# Patient Record
Sex: FEMALE | Race: WHITE
[De-identification: names, ages, dates, MRNs, and addresses within clinical notes are randomized per-mention and may not be internally consistent; named-entity substitution may affect disease eponyms.]

---

## 2018-03-26 PROBLEM — Z00.00 ENCOUNTER FOR PREVENTIVE HEALTH EXAMINATION: Status: ACTIVE | Noted: 2018-03-26

## 2018-03-29 ENCOUNTER — APPOINTMENT (OUTPATIENT)
Dept: RHEUMATOLOGY | Facility: CLINIC | Age: 53
End: 2018-03-29
Payer: COMMERCIAL

## 2018-03-29 ENCOUNTER — LABORATORY RESULT (OUTPATIENT)
Age: 53
End: 2018-03-29

## 2018-03-29 VITALS
BODY MASS INDEX: 28 KG/M2 | HEART RATE: 74 BPM | SYSTOLIC BLOOD PRESSURE: 118 MMHG | DIASTOLIC BLOOD PRESSURE: 72 MMHG | HEIGHT: 63 IN | WEIGHT: 158 LBS | OXYGEN SATURATION: 98 %

## 2018-03-29 DIAGNOSIS — Z83.3 FAMILY HISTORY OF DIABETES MELLITUS: ICD-10-CM

## 2018-03-29 DIAGNOSIS — Z82.49 FAMILY HISTORY OF ISCHEMIC HEART DISEASE AND OTHER DISEASES OF THE CIRCULATORY SYSTEM: ICD-10-CM

## 2018-03-29 DIAGNOSIS — Z87.891 PERSONAL HISTORY OF NICOTINE DEPENDENCE: ICD-10-CM

## 2018-03-29 DIAGNOSIS — J45.909 UNSPECIFIED ASTHMA, UNCOMPLICATED: ICD-10-CM

## 2018-03-29 DIAGNOSIS — Z78.9 OTHER SPECIFIED HEALTH STATUS: ICD-10-CM

## 2018-03-29 DIAGNOSIS — M25.50 PAIN IN UNSPECIFIED JOINT: ICD-10-CM

## 2018-03-29 DIAGNOSIS — E05.90 THYROTOXICOSIS, UNSPECIFIED W/OUT THYROTOXIC CRISIS OR STORM: ICD-10-CM

## 2018-03-29 PROCEDURE — 36415 COLL VENOUS BLD VENIPUNCTURE: CPT

## 2018-03-29 PROCEDURE — 99205 OFFICE O/P NEW HI 60 MIN: CPT | Mod: 25

## 2018-03-29 RX ORDER — ALBUTEROL 90 MCG
AEROSOL (GRAM) INHALATION
Refills: 0 | Status: ACTIVE | COMMUNITY

## 2018-03-29 RX ORDER — LEVOTHYROXINE SODIUM 137 UG/1
TABLET ORAL
Refills: 0 | Status: ACTIVE | COMMUNITY

## 2018-04-03 LAB
ANA PAT FLD IF-IMP: ABNORMAL
ANA SER IF-ACNC: ABNORMAL
B19V IGG SER QL IA: 0.8 INDEX
B19V IGG+IGM SER-IMP: NEGATIVE
B19V IGG+IGM SER-IMP: NORMAL
B19V IGM FLD-ACNC: 0.1 INDEX
B19V IGM SER-ACNC: NEGATIVE
B2 GLYCOPROT1 IGA SERPL IA-ACNC: <5 SAU
B2 GLYCOPROT1 IGG SER-ACNC: <5 SGU
B2 GLYCOPROT1 IGM SER-ACNC: <5 SMU
C3 SERPL-MCNC: 100 MG/DL
C4 SERPL-MCNC: 26 MG/DL
CARDIOLIPIN IGM SER-MCNC: 6.6 MPL
CARDIOLIPIN IGM SER-MCNC: <5 GPL
CCP AB SER IA-ACNC: <8 UNITS
CRP SERPL-MCNC: 0.4 MG/DL
DEPRECATED CARDIOLIPIN IGA SER: <5 APL
DSDNA AB SER-ACNC: <12 IU/ML
ENA RNP AB SER IA-ACNC: 6.2 AL
ENA SM AB SER IA-ACNC: 0.5 AL
ERYTHROCYTE [SEDIMENTATION RATE] IN BLOOD BY WESTERGREN METHOD: 8 MM/HR
IGA SER QL IEP: 248 MG/DL
IGG SER QL IEP: 1120 MG/DL
IGM SER QL IEP: 84 MG/DL
RF+CCP IGG SER-IMP: NEGATIVE
RHEUMATOID FACT SER QL: <7 IU/ML
THYROPEROXIDASE AB SERPL IA-ACNC: 1115 IU/ML
TSH SERPL-ACNC: 0.13 UIU/ML

## 2018-04-30 ENCOUNTER — APPOINTMENT (OUTPATIENT)
Dept: RHEUMATOLOGY | Facility: CLINIC | Age: 53
End: 2018-04-30

## 2023-10-20 ENCOUNTER — OFFICE VISIT (OUTPATIENT)
Age: 58
End: 2023-10-20
Payer: COMMERCIAL

## 2023-10-20 VITALS
HEART RATE: 107 BPM | WEIGHT: 152 LBS | TEMPERATURE: 99.5 F | RESPIRATION RATE: 18 BRPM | SYSTOLIC BLOOD PRESSURE: 96 MMHG | DIASTOLIC BLOOD PRESSURE: 66 MMHG | OXYGEN SATURATION: 97 %

## 2023-10-20 DIAGNOSIS — U07.1 COVID-19: Primary | ICD-10-CM

## 2023-10-20 PROBLEM — K21.9 GERD (GASTROESOPHAGEAL REFLUX DISEASE): Status: ACTIVE | Noted: 2023-10-20

## 2023-10-20 PROBLEM — R20.2 PARESTHESIA: Status: ACTIVE | Noted: 2017-03-16

## 2023-10-20 PROBLEM — H40.009 PREGLAUCOMA: Status: ACTIVE | Noted: 2023-10-20

## 2023-10-20 PROBLEM — M54.81 OCCIPITAL NEURALGIA OF LEFT SIDE: Status: ACTIVE | Noted: 2017-03-16

## 2023-10-20 PROBLEM — D47.2 MGUS (MONOCLONAL GAMMOPATHY OF UNKNOWN SIGNIFICANCE): Status: ACTIVE | Noted: 2018-03-22

## 2023-10-20 PROBLEM — R76.8 ANA POSITIVE: Status: ACTIVE | Noted: 2018-03-22

## 2023-10-20 PROBLEM — H92.02 OTALGIA OF LEFT EAR: Status: ACTIVE | Noted: 2022-06-22

## 2023-10-20 PROBLEM — E55.9 VITAMIN D DEFICIENCY: Status: ACTIVE | Noted: 2018-03-22

## 2023-10-20 PROBLEM — R79.89 LOW VITAMIN B12 LEVEL: Status: ACTIVE | Noted: 2018-03-22

## 2023-10-20 PROBLEM — R63.5 WEIGHT GAIN: Status: ACTIVE | Noted: 2020-04-22

## 2023-10-20 PROBLEM — M17.11 PRIMARY OSTEOARTHRITIS OF RIGHT KNEE: Status: ACTIVE | Noted: 2021-12-20

## 2023-10-20 PROBLEM — M54.50 LOWER BACK PAIN: Status: ACTIVE | Noted: 2023-10-20

## 2023-10-20 PROCEDURE — G0382 LEV 3 HOSP TYPE B ED VISIT: HCPCS

## 2023-10-20 RX ORDER — GABAPENTIN 300 MG/1
300 CAPSULE ORAL
COMMUNITY
Start: 2023-09-25

## 2023-10-20 RX ORDER — ONDANSETRON 8 MG/1
8 TABLET, ORALLY DISINTEGRATING ORAL EVERY 8 HOURS PRN
COMMUNITY
Start: 2023-09-27

## 2023-10-20 RX ORDER — OMEPRAZOLE 40 MG/1
40 CAPSULE, DELAYED RELEASE ORAL
COMMUNITY
Start: 2023-06-05

## 2023-10-20 RX ORDER — LEVOTHYROXINE SODIUM 137 UG/1
1 TABLET ORAL DAILY
COMMUNITY
Start: 2023-09-07

## 2023-10-20 RX ORDER — IBUPROFEN 800 MG/1
TABLET ORAL
COMMUNITY
Start: 2023-09-01

## 2023-10-20 RX ORDER — BUDESONIDE AND FORMOTEROL FUMARATE DIHYDRATE 80; 4.5 UG/1; UG/1
2 AEROSOL RESPIRATORY (INHALATION) 2 TIMES DAILY
COMMUNITY
Start: 2023-04-24

## 2023-10-20 RX ORDER — MIRABEGRON 50 MG/1
1 TABLET, FILM COATED, EXTENDED RELEASE ORAL DAILY
COMMUNITY
Start: 2023-10-19

## 2023-10-20 RX ORDER — MELOXICAM 15 MG/1
15 TABLET ORAL DAILY PRN
COMMUNITY
Start: 2023-09-25

## 2023-10-20 RX ORDER — SEMAGLUTIDE 1.7 MG/.75ML
1.7 INJECTION, SOLUTION SUBCUTANEOUS
COMMUNITY
Start: 2023-10-12

## 2023-10-20 RX ORDER — FESOTERODINE FUMARATE 4 MG/1
1 TABLET, EXTENDED RELEASE ORAL DAILY
COMMUNITY
Start: 2023-07-28

## 2023-10-20 NOTE — LETTER
[facility]  [address] [address]  [phone]  Dept: [phone]    October 20, 2023    Patient: Sumaya Guerrero  YOB: 1965    Sumaya Guerrero was seen and evaluated at our [facility]. Please note if Covid and Flu tests are negative, they may return to work when fever free for 24 hours without the use of a fever reducing agent. If Covid or Flu test is positive, they may return to work on 10/24/2023, as this is 5 days from the onset of symptoms. Upon return, they must then adhere to strict masking for an additional 5 days.     Sincerely,    ANSELMO Dial

## 2023-10-20 NOTE — PATIENT INSTRUCTIONS
May continue inhalers/nebulizers as previously prescirbed and over-the-counter products for symptoms: tylenol for fevers, ibuprofen for body aches, flonase (fluticasone) with nasal saline and sudafed for nasal congestion, mucinex for cough, and airborne/emergen-c for vitamin supplementation. May return to work if [address] for 24 hours without the use of medications and 5 days after symptom onset but recommend strict masking for 5 additional days once return to work. Follow-up with PCP in 3-5 days if no improvement of symptoms. Report to ER if symptoms worsen or develop difficulty breathing.

## 2023-10-20 NOTE — PROGRESS NOTES
[facility]        NAME: Nathaly Calvo is a 60 y.o. female  : 1965    MRN: [id number]  DATE: 2023  TIME: 1:51 PM    Assessment and Plan   COVID-19 [U07.1]  1. COVID-19          COVID positive at home, no need to repeat test in clinic. VSS in clinic, appears in no acute distress. Advised patient I am unable to offer Paxlovid at this time as she does not have recent labs with a GFR since . Advised her to follow-up with her PCP within 5 days of symptom onset regarding Paxlovid and lab work. Educated on use of OTC products for symptoms. Advised close follow-up with PCP or to report to the ER if symptoms worsen. Work note provided to return to work on 10/24/2023, 5 days from symptom onset. Patient verbalizes understanding and agreeable to plan. Patient Instructions     May continue inhalers/nebulizers as previously prescirbed and over-the-counter products for symptoms: tylenol for fevers, ibuprofen for body aches, flonase (fluticasone) with nasal saline and sudafed for nasal congestion, mucinex for cough, and airborne/emergen-c for vitamin supplementation. May return to work if [address] for 24 hours without the use of medications and 5 days after symptom onset but recommend strict masking for 5 additional days once return to work. Follow-up with PCP in 3-5 days if no improvement of symptoms. Report to ER if symptoms worsen or develop difficulty breathing. Chief Complaint     Chief Complaint   Patient presents with    Fever     Fever, body aches, cough X 1 day. Did at-home COVID test that was +         History of Present Illness       60year old female presents for evaluation of fatigue, fever (tmax 103F yesterday), body aches, cough, and congestion that started yesterday. She did a home COVID test that was positive yesterday and she also has a history of asthma. She reports some shortness of breath with exertion yesterday that resolved with inhaler use.  She denies associated productive sputum, nausea, vomiting, or diarrhea. She has been taking tylenol for fevers with some improvement. Her last dose was last night. Fever  This is a new problem. The current episode started yesterday. The problem occurs constantly. The problem has been unchanged. Associated symptoms include chills, congestion, coughing, fatigue, a fever, headaches, myalgias and weakness. Pertinent negatives include no abdominal pain, anorexia, arthralgias, change in bowel habit, chest pain, diaphoresis, joint swelling, nausea, neck pain, numbness, rash, sore throat, swollen glands, urinary symptoms, vertigo, visual change or vomiting. Nothing aggravates the symptoms. She has tried acetaminophen for the symptoms. The treatment provided mild relief. Review of Systems   Review of Systems   Constitutional:  Positive for activity change, chills, fatigue and fever. Negative for appetite change and diaphoresis. HENT:  Positive for congestion, postnasal drip, rhinorrhea and sinus pressure. Negative for sinus pain, sneezing, sore throat and trouble swallowing. Eyes:  Negative for visual disturbance. Respiratory:  Positive for cough. Negative for chest tightness and shortness of breath. Cardiovascular:  Negative for chest pain and palpitations. Gastrointestinal:  Negative for abdominal pain, anorexia, change in bowel habit, constipation, diarrhea, nausea and vomiting. Musculoskeletal:  Positive for myalgias. Negative for arthralgias, back pain, joint swelling and neck pain. Skin:  Negative for color change, pallor and rash. Allergic/Immunologic: Negative for environmental allergies and food allergies. Neurological:  Positive for weakness and headaches. Negative for dizziness, vertigo, light-headedness and numbness.          Current Medications       Current Outpatient Medications:     budesonide-formoterol (SYMBICORT) 80-4.5 MCG/ACT inhaler, Inhale 2 puffs 2 (two) times a day, Disp: , Rfl:     diclofenac sodium (VOLTAREN) 50 mg EC tablet, , Disp: , Rfl:     Fesoterodine Fumarate ER 4 MG TB24, Take 1 tablet by mouth daily, Disp: , Rfl:     gabapentin (NEURONTIN) 300 mg capsule, Take 300 mg by mouth daily at bedtime, Disp: , Rfl:     ibuprofen (MOTRIN) 800 mg tablet, TAKE 1 TABLET BY MOUTH EVERY 8 HOURS WITH FOOD FOR 14 DAYS AS NEEDED, Disp: , Rfl:     levothyroxine 137 mcg tablet, Take 1 tablet by mouth daily, Disp: , Rfl:     meloxicam (MOBIC) 15 mg tablet, Take 15 mg by mouth daily as needed, Disp: , Rfl:     Myrbetriq 50 MG TB24, Take 1 tablet by mouth daily, Disp: , Rfl:     omeprazole (PriLOSEC) 40 MG capsule, Take 40 mg by mouth, Disp: , Rfl:     ondansetron (ZOFRAN-ODT) 8 mg disintegrating tablet, Take 8 mg by mouth every 8 (eight) hours as needed, Disp: , Rfl:     Wegovy 1.7 MG/0.75ML, Inject 1.7 mg under the skin, Disp: , Rfl:     Cholecalciferol 50 MCG (2000 UT) TABS, Take 2,000 Units by mouth, Disp: , Rfl:     Current Allergies     Allergies as of 10/20/2023 - Reviewed 10/20/2023   Allergen Reaction Noted    Gadobutrol Hives 11/23/2021    Iopromide Hives 03/23/2017            The following portions of the patient's history were reviewed and updated as appropriate: allergies, current medications, past family history, past medical history, past social history, past surgical history and problem list.     History reviewed. No pertinent past medical history. History reviewed. No pertinent surgical history. History reviewed. No pertinent family history. Medications have been verified. Objective   BP 96/66 (BP Location: Left arm, Patient Position: Sitting, Cuff Size: Adult)   Pulse (!) 107   Temp 99.5 °F (37.5 °C) (Tympanic)   Resp 18   Wt 68.9 kg (152 lb)   SpO2 97%        Physical Exam     Physical Exam  Vitals and nursing note reviewed. Constitutional:       General: She is awake. Appearance: Normal appearance. She is well-developed and overweight.    HENT:      Head: Normocephalic and atraumatic. Right Ear: Hearing, ear canal and external ear normal. A middle ear effusion is present. Left Ear: Hearing, ear canal and external ear normal. A middle ear effusion is present. Nose: Congestion and rhinorrhea present. Rhinorrhea is clear. Right Turbinates: Enlarged. Not swollen or pale. Left Turbinates: Enlarged. Not swollen or pale. Right Sinus: Maxillary sinus tenderness present. No frontal sinus tenderness. Left Sinus: Maxillary sinus tenderness present. No frontal sinus tenderness. Mouth/Throat:      Lips: Pink. Mouth: Mucous membranes are moist.      Pharynx: Oropharynx is clear. Uvula midline. No oropharyngeal exudate or posterior oropharyngeal erythema. Tonsils: No tonsillar exudate or tonsillar abscesses. Eyes:      Conjunctiva/sclera: Conjunctivae normal.   Cardiovascular:      Rate and Rhythm: Normal rate and regular rhythm. Pulses: Normal pulses. Heart sounds: Normal heart sounds. Pulmonary:      Effort: Pulmonary effort is normal.      Breath sounds: Normal breath sounds. Musculoskeletal:      Cervical back: Full passive range of motion without pain, normal range of motion and neck supple. Lymphadenopathy:      Cervical: No cervical adenopathy. Skin:     General: Skin is warm and dry. Neurological:      General: No focal deficit present. Mental Status: She is alert and oriented to person, place, and time. Psychiatric:         Mood and Affect: Mood normal.         Behavior: Behavior normal. Behavior is cooperative. Thought Content:  Thought content normal.         Judgment: Judgment normal.

## 2024-01-18 ENCOUNTER — APPOINTMENT (OUTPATIENT)
Age: 59
End: 2024-01-18
Payer: COMMERCIAL

## 2024-01-18 ENCOUNTER — OFFICE VISIT (OUTPATIENT)
Age: 59
End: 2024-01-18
Payer: COMMERCIAL

## 2024-01-18 VITALS
RESPIRATION RATE: 18 BRPM | WEIGHT: 147 LBS | TEMPERATURE: 97.3 F | SYSTOLIC BLOOD PRESSURE: 124 MMHG | OXYGEN SATURATION: 98 % | HEART RATE: 74 BPM | DIASTOLIC BLOOD PRESSURE: 78 MMHG

## 2024-01-18 DIAGNOSIS — M25.572 ACUTE LEFT ANKLE PAIN: ICD-10-CM
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
## 2024-01-18 DIAGNOSIS — M25.562 ACUTE PAIN OF LEFT KNEE: ICD-10-CM

## 2024-01-18 DIAGNOSIS — M25.552 LEFT HIP PAIN: ICD-10-CM

## 2024-01-18 PROCEDURE — G0382 LEV 3 HOSP TYPE B ED VISIT: HCPCS | Performed by: PHYSICIAN ASSISTANT

## 2024-01-18 PROCEDURE — 73564 X-RAY EXAM KNEE 4 OR MORE: CPT

## 2024-01-18 PROCEDURE — 73610 X-RAY EXAM OF ANKLE: CPT

## 2024-01-18 PROCEDURE — 73501 X-RAY EXAM HIP UNI 1 VIEW: CPT

## 2024-01-18 RX ORDER — SENNOSIDES 8.6 MG
650 CAPSULE ORAL EVERY 8 HOURS PRN
Qty: 30 TABLET | Refills: 0 | Status: SHIPPED | OUTPATIENT
Start: 2024-01-18

## 2024-01-18 RX ORDER — IBUPROFEN 200 MG
400 TABLET ORAL EVERY 6 HOURS PRN
Start: 2024-01-18

## 2024-01-18 RX ORDER — METHOCARBAMOL 500 MG/1
500 TABLET, FILM COATED ORAL 2 TIMES DAILY PRN
Qty: 8 TABLET | Refills: 0 | Status: SHIPPED | OUTPATIENT
Start: 2024-01-18 | End: 2024-01-22
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
## 2024-01-19 NOTE — PATIENT INSTRUCTIONS
Contusion in Adults   WHAT YOU NEED TO KNOW:   A contusion is a bruise that appears on your skin after an injury. A bruise happens when small blood vessels tear but skin does not. Blood leaks into nearby tissue, such as soft tissue or muscle.  DISCHARGE INSTRUCTIONS:   Return to the emergency department if:   You have new trouble moving the injured area.    You have tingling or numbness in or near the injured area.    Your hand or foot below the bruise gets cold or turns pale.    Call your doctor if:   You find a new lump in the injured area.    Your symptoms do not improve with treatment after 4 to 5 days.    You have questions or concerns about your condition or care.    Medicines:  You may need any of the following:  NSAIDs  help decrease swelling and pain or fever. This medicine is available with or without a doctor's order. NSAIDs can cause stomach bleeding or kidney problems in certain people. If you take blood thinner medicine, always ask your healthcare provider if NSAIDs are safe for you. Always read the medicine label and follow directions.    Prescription pain medicine  may be given. Ask your healthcare provider how to take this medicine safely. Some prescription pain medicines contain acetaminophen. Do not take other medicines that contain acetaminophen without talking to your healthcare provider. Too much acetaminophen may cause liver damage. Prescription pain medicine may cause constipation. Ask your healthcare provider how to prevent or treat constipation.     Take your medicine as directed.  Contact your healthcare provider if you think your medicine is not helping or if you have side effects. Tell your provider if you are allergic to any medicine. Keep a list of the medicines, vitamins, and herbs you take. Include the amounts, and when and why you take them. Bring the list or the pill bottles to follow-up visits. Carry your medicine list with you in case of an emergency.    Help a contusion heal:    Rest the injured area  or use it less than usual. If you bruised your leg or foot, you may need crutches or a cane to help you walk. This will help you keep weight off your injured body part.     Apply ice  to decrease swelling and pain. Ice may also help prevent tissue damage. Use an ice pack, or put crushed ice in a plastic bag. Cover it with a towel and place it on your bruise for 15 to 20 minutes every hour or as directed.    Use compression  to support the area and decrease swelling. Wrap an elastic bandage around the area over the bruised muscle. Make sure the bandage is not too tight. You should be able to fit 1 finger between the bandage and your skin.    Elevate (raise) your injured body part  above the level of your heart to help decrease pain and swelling. Use pillows, blankets, or rolled towels to elevate the area as often as you can.    Do not drink alcohol  as directed. Alcohol may slow healing.    Do not stretch injured muscles  right after your injury. Ask your healthcare provider when and how you may safely stretch after your injury. Gentle stretches can help increase your flexibility.    Do not massage the area or put heating pads  on the bruise right after your injury. Heat and massage may slow healing. Your healthcare provider may tell you to apply heat after several days. At that time, heat will start to help the injury heal.    Prevent another contusion:   Stretch and warm up before you play sports or exercise.    Wear protective gear when you play sports. Examples are shin guards and padding.     If you begin a new physical activity, start slowly to give your body a chance to adjust.    Follow up with your doctor as directed:  Write down your questions so you remember to ask them during your visits.  © Copyright [employer] 2023 Information is for End User's use only and may not be sold, redistributed or otherwise used for commercial purposes.  The above information is an  only.  It is not intended as medical advice for individual conditions or treatments. Talk to your doctor, nurse or pharmacist before following any medical regimen to see if it is safe and effective for you.

## 2024-09-19 ENCOUNTER — EVALUATION (OUTPATIENT)
Dept: PHYSICAL THERAPY | Facility: CLINIC | Age: 59
End: 2024-09-19
Payer: COMMERCIAL

## 2024-09-19 DIAGNOSIS — G89.29 CHRONIC PAIN OF LEFT ANKLE: ICD-10-CM

## 2024-09-19 DIAGNOSIS — M25.572 CHRONIC PAIN OF LEFT ANKLE: ICD-10-CM

## 2024-09-19 DIAGNOSIS — M25.372 LEFT ANKLE INSTABILITY: Primary | ICD-10-CM

## 2024-09-19 PROCEDURE — 97161 PT EVAL LOW COMPLEX 20 MIN: CPT

## 2024-09-19 PROCEDURE — 97110 THERAPEUTIC EXERCISES: CPT
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
## 2024-09-26 ENCOUNTER — OFFICE VISIT (OUTPATIENT)
Dept: PHYSICAL THERAPY | Facility: CLINIC | Age: 59
End: 2024-09-26
Payer: COMMERCIAL

## 2024-09-26 DIAGNOSIS — M25.372 LEFT ANKLE INSTABILITY: Primary | ICD-10-CM

## 2024-09-26 DIAGNOSIS — M25.572 CHRONIC PAIN OF LEFT ANKLE: ICD-10-CM

## 2024-09-26 DIAGNOSIS — G89.29 CHRONIC PAIN OF LEFT ANKLE: ICD-10-CM

## 2024-09-26 PROCEDURE — 97112 NEUROMUSCULAR REEDUCATION: CPT

## 2024-09-26 PROCEDURE — 97110 THERAPEUTIC EXERCISES: CPT

## 2024-10-03 ENCOUNTER — OFFICE VISIT (OUTPATIENT)
Dept: PHYSICAL THERAPY | Facility: CLINIC | Age: 59
End: 2024-10-03
Payer: COMMERCIAL

## 2024-10-03 DIAGNOSIS — G89.29 CHRONIC PAIN OF LEFT ANKLE: ICD-10-CM

## 2024-10-03 DIAGNOSIS — M25.572 CHRONIC PAIN OF LEFT ANKLE: ICD-10-CM

## 2024-10-03 DIAGNOSIS — M25.372 LEFT ANKLE INSTABILITY: Primary | ICD-10-CM

## 2024-10-03 PROCEDURE — 97110 THERAPEUTIC EXERCISES: CPT

## 2024-10-03 PROCEDURE — 97112 NEUROMUSCULAR REEDUCATION: CPT

## 2024-10-03 NOTE — PROGRESS NOTES
"Daily Note     Today's date: 10/3/2024  Patient name: Patrica Montelongo  : 1965  MRN: [id number]  Referring provider: Piyush Crawford MD  Dx:   Encounter Diagnosis     ICD-10-CM    1. Left ankle instability  M25.372       2. Chronic pain of left ankle  M25.572     G89.29         Start Time: 1413  Stop Time: 1455  Total time in clinic (min): 42 minutes    Subjective: Pt reports no major changes since last visit. Pt reports that she is consistent w/ her HEP and that       Objective: See treatment diary below      Assessment: Tolerated treatment well. Updated HEP NV. Add functional exercise such as squats and lunges to further challenge Pt. Pt still has minimal symptom irritability w/ ankle circles w/ clock wise being the most irritable. Patient demonstrated fatigue post treatment, exhibited good technique with therapeutic exercises, and would benefit from continued PT for increased ROM, increased strength, increased stability, and decreased symptom irritability.       Plan: Continue per plan of care.      Precautions: Low back pain and Cervical pain     POC expires Unit limit Auth Expiration date PT/OT/ST + Visit Limit?   24 BOMN N/A BOMN                           Visit/Unit Tracking  AUTH Status:  Date  10/3            N/A Used 1 2 3             Remaining                  HEP: [id number]   Manuals  10/3          L ankle PROM  JMK JMK          Mid foot G3  JMK JMK          G3 Dorsiflexion  JMK JMK                       Neuro Re-Ed             SLR  2x10 3x10           Side lying hip abduction  2x10 3x10 3\"          Bridge  2x10 3\" 3x10 3\"          SL stance  Foam 3x30\" Foam 4x30\"                                                 Ther Ex             Hep education 8'            Bike   5' 5'          BAPS board  CW, CCW, DF/PF, I/E x30 ea CW, CCW, DF/PF, I/E x30 ea          BOSU x4  X30 ea X30 ea          TB ankle x4  GTB 3x10 ea                                                  Ther Activity   "                                     Gait Training                                       Modalities

## 2024-10-10 ENCOUNTER — OFFICE VISIT (OUTPATIENT)
Dept: PHYSICAL THERAPY | Facility: CLINIC | Age: 59
End: 2024-10-10
Payer: COMMERCIAL

## 2024-10-10 DIAGNOSIS — G89.29 CHRONIC PAIN OF LEFT ANKLE: ICD-10-CM

## 2024-10-10 DIAGNOSIS — M25.572 CHRONIC PAIN OF LEFT ANKLE: ICD-10-CM

## 2024-10-10 DIAGNOSIS — M25.372 LEFT ANKLE INSTABILITY: Primary | ICD-10-CM

## 2024-10-10 PROCEDURE — 97112 NEUROMUSCULAR REEDUCATION: CPT

## 2024-10-10 PROCEDURE — 97110 THERAPEUTIC EXERCISES: CPT

## 2024-10-10 NOTE — PROGRESS NOTES
Daily Note/ DC note     Today's date: 10/10/2024  Patient name: Patrica Montelongo  : 1965  MRN: [id number]  Referring provider: Piyush Crawford MD  Dx:   Encounter Diagnosis     ICD-10-CM    1. Left ankle instability  M25.372       2. Chronic pain of left ankle  M25.572     G89.29         Start Time: 1102  Stop Time: 1140  Total time in clinic (min): 38 minutes    Subjective: Pt reports that she is returning to work in [place] and would like to be DC from therapy w/ HEP.       Objective: See treatment diary below      Assessment: Tolerated treatment well. Provided Pt w/ updated HEP, educated Pt on new exercises. Pt did well w/ new balance activities today, occasionally utilized stepping strategy to regain balance. FOTO score improved to 59 surpassing MDC.  Pt DC from PT due to travel restriction, recommended she continue PT at different location if HEP is not successful. Patient demonstrated fatigue post treatment, exhibited good technique with therapeutic exercises, and would benefit from continued PT for increased ROM, increased strength, increased stability, and decreased symptom irritability.     Goals  Short term:  Pt will be independent w/ individualized HEP- MET  Pt will have a decrease in symptom irritability by 2 points- MET     Long term:  Pt will be able to perform stair negotiation w/o symptom irritability- Partially met   Pt will be able to walk one mile on uneven surfaces w/o symptom irritability- Partially MET  Pt will be able to ambulate w/o a brace for ADLs and walking over a mile- MET  Pt will improve FOTO by MDC- MET    Plan: DC w/ updated HEP     Precautions: Low back pain and Cervical pain     POC expires Unit limit Auth Expiration date PT/OT/ST + Visit Limit?   24 BOMN N/A BOMN                           Visit/Unit Tracking  AUTH Status:  Date 9/19 9/26 10/3 10/10           N/A Used 1 2 3 4            Remaining                  HEP: [id number]   Manuals 9/19 9/26 10/3 10/10        "  L ankle PROM  JMK ROSALINEK          Mid foot G3  JMK JMK          G3 Dorsiflexion  ROSALINEK NARAYAN                       Neuro Re-Ed             SLR  2x10 3x10  3x10         Side lying hip abduction  2x10 3x10 3\" 3x10 3\"         Bridge  2x10 3\" 3x10 3\" 3x10 3\"         SL stance  Foam 3x30\" Foam 4x30\"          SL stance ball toss    2x30 FW and SW                                   Ther Ex             Hep education 8'            Bike   5' 5' 5'         BAPS board  CW, CCW, DF/PF, I/E x30 ea CW, CCW, DF/PF, I/E x30 ea CW, CCW, DF/PF, I/E x30 ea         BOSU x4  X30 ea X30 ea X40 ea         TB ankle x4  GTB 3x10 ea                                                  Ther Activity                                       Gait Training                                       Modalities                                            "

## 2024-10-17 ENCOUNTER — APPOINTMENT (OUTPATIENT)
Dept: PHYSICAL THERAPY | Facility: CLINIC | Age: 59
End: 2024-10-17
Payer: COMMERCIAL

## 2024-10-24 ENCOUNTER — APPOINTMENT (OUTPATIENT)
Dept: PHYSICAL THERAPY | Facility: CLINIC | Age: 59
End: 2024-10-24
Payer: COMMERCIAL

## 2024-10-31 ENCOUNTER — APPOINTMENT (OUTPATIENT)
Dept: PHYSICAL THERAPY | Facility: CLINIC | Age: 59
End: 2024-10-31
Payer: COMMERCIAL

## 2025-03-06 ENCOUNTER — OFFICE VISIT (OUTPATIENT)
Age: 60
End: 2025-03-06
Payer: COMMERCIAL

## 2025-03-06 VITALS
HEIGHT: 61 IN | HEART RATE: 80 BPM | DIASTOLIC BLOOD PRESSURE: 90 MMHG | OXYGEN SATURATION: 98 % | RESPIRATION RATE: 18 BRPM | SYSTOLIC BLOOD PRESSURE: 145 MMHG | WEIGHT: 133 LBS | TEMPERATURE: 97.7 F | BODY MASS INDEX: 25.11 KG/M2

## 2025-03-06 DIAGNOSIS — R68.89 FLU-LIKE SYMPTOMS: Primary | ICD-10-CM

## 2025-03-06 DIAGNOSIS — R05.1 ACUTE COUGH: ICD-10-CM

## 2025-03-06 DIAGNOSIS — R11.0 NAUSEA: ICD-10-CM

## 2025-03-06 PROCEDURE — G0382 LEV 3 HOSP TYPE B ED VISIT: HCPCS | Performed by: PHYSICIAN ASSISTANT

## 2025-03-06 RX ORDER — ONDANSETRON 4 MG/1
4 TABLET, FILM COATED ORAL EVERY 8 HOURS PRN
Qty: 15 TABLET | Refills: 0 | Status: SHIPPED | OUTPATIENT
Start: 2025-03-06

## 2025-03-06 RX ORDER — METHYLPREDNISOLONE 4 MG/1
TABLET ORAL
Qty: 1 EACH | Refills: 0 | Status: SHIPPED | OUTPATIENT
Start: 2025-03-06

## 2025-03-06 NOTE — PROGRESS NOTES
[facility]        NAME: Patrica Montelongo is a 59 y.o. female  : 1965    MRN: [id number]  DATE: 2025  TIME: 4:41 PM    Assessment and Plan   Flu-like symptoms [R68.89]  1. Flu-like symptoms  methylPREDNISolone 4 MG tablet therapy pack    ondansetron (ZOFRAN) 4 mg tablet      2. Acute cough  methylPREDNISolone 4 MG tablet therapy pack      3. Nausea  ondansetron (ZOFRAN) 4 mg tablet          Follow-up with PCP if no improvement over the weekend go to ER for any worsening no evidence of secondary bacterial infection today based on examination and history    The patient and/or parent/legal guardian verbalized understanding of exam findings and   Treatment plan. We engaged in the shared decision-making process and treatment options were   discussed at length with the patient.  All questions, concerns and complaints were answered and   addressed to the patient's' and/or parent/legal guardians's satisfaction.    Patient Instructions   There are no Patient Instructions on file for this visit.    Follow up with PCP in 3-5 days.  Proceed to  ER if symptoms worsen.    If tests are performed, our office will contact you with results only if   changes need to made to the care plan discussed with you at the visit.   You can review your full results on [facility].     Chief Complaint     Chief Complaint   Patient presents with   • Cold Like Symptoms     Started 6 days ago, patient complains of cough, fatigue, nausea, vomiting, hoarse voice.            History of Present Illness       HPI  Pt has had 6 days of cough fatigue nausea vomiting, hoarse voice. Has been using inhaler more often. Nausea is most of the day. Keeping fluids down. Vomited twice this morning. Also having loose stool about 3x a day.     Review of Systems   Review of Systems  All other related systems reviewed with patient or accompanying historian and are negative except as noted in HPI    Current Medications       Current  Outpatient Medications:   •  acetaminophen (TYLENOL) 650 mg CR tablet, Take 1 tablet (650 mg total) by mouth every 8 (eight) hours as needed for mild pain, Disp: 30 tablet, Rfl: 0  •  budesonide-formoterol (SYMBICORT) 80-4.5 MCG/ACT inhaler, Inhale 2 puffs 2 (two) times a day, Disp: , Rfl:   •  Cholecalciferol 50 MCG (2000 UT) TABS, Take 2,000 Units by mouth, Disp: , Rfl:   •  gabapentin (NEURONTIN) 300 mg capsule, Take 300 mg by mouth daily at bedtime, Disp: , Rfl:   •  ibuprofen (MOTRIN) 200 mg tablet, Take 2 tablets (400 mg total) by mouth every 6 (six) hours as needed for mild pain, Disp: , Rfl:   •  levothyroxine 137 mcg tablet, Take 1 tablet by mouth daily, Disp: , Rfl:   •  methylPREDNISolone 4 MG tablet therapy pack, Use as directed on package, Disp: 1 each, Rfl: 0  •  Myrbetriq 50 MG TB24, Take 1 tablet by mouth daily, Disp: , Rfl:   •  omeprazole (PriLOSEC) 40 MG capsule, Take 40 mg by mouth, Disp: , Rfl:   •  ondansetron (ZOFRAN) 4 mg tablet, Take 1 tablet (4 mg total) by mouth every 8 (eight) hours as needed for nausea or vomiting, Disp: 15 tablet, Rfl: 0  •  Wegovy 1.7 MG/0.75ML, Inject 1.7 mg under the skin, Disp: , Rfl:   •  Fesoterodine Fumarate ER 4 MG TB24, Take 1 tablet by mouth daily (Patient not taking: Reported on 3/6/2025), Disp: , Rfl:   •  methocarbamol (ROBAXIN) 500 mg tablet, Take 1 tablet (500 mg total) by mouth 2 (two) times a day as needed for muscle spasms for up to 4 days No driving while on this medication., Disp: 8 tablet, Rfl: 0    Current Allergies     Allergies as of 03/06/2025 - Reviewed 03/06/2025   Allergen Reaction Noted   • Gadobutrol Hives 11/23/2021   • Iopromide Hives 03/23/2017            The following portions of the patient's history were reviewed and updated as appropriate: allergies, current medications, past family history, past medical history, past social history, past surgical history and problem list.     No past medical history on file.    No past surgical  "history on file.    No family history on file.      Medications have been verified.        Objective   /90 (BP Location: Right arm, Patient Position: Sitting)   Pulse 80   Temp 97.7 °F (36.5 °C)   Resp 18   Ht 5' 1\" (1.549 m)   Wt 60.3 kg (133 lb)   SpO2 98%   BMI 25.13 kg/m²   No LMP recorded. Patient has had a hysterectomy.       Physical Exam     Physical Exam  Constitutional:       General: She is not in acute distress.     Appearance: She is well-developed. She is not diaphoretic.   HENT:      Head: Normocephalic and atraumatic.      Mouth/Throat:      Mouth: Mucous membranes are moist.      Pharynx: No oropharyngeal exudate or posterior oropharyngeal erythema.   Eyes:      General: No scleral icterus.     Conjunctiva/sclera: Conjunctivae normal.   Neck:      Trachea: No tracheal deviation.   Cardiovascular:      Rate and Rhythm: Normal rate and regular rhythm.      Heart sounds: Normal heart sounds. No murmur heard.  Pulmonary:      Effort: Pulmonary effort is normal. No respiratory distress.      Breath sounds: Normal breath sounds. No stridor. No wheezing, rhonchi or rales.   Abdominal:      General: Abdomen is flat. Bowel sounds are normal. There is no distension.      Palpations: Abdomen is soft.      Tenderness: There is abdominal tenderness (epigastric only - mild). There is no guarding.   Musculoskeletal:      Cervical back: Normal range of motion and neck supple.   Lymphadenopathy:      Cervical: No cervical adenopathy.   Skin:     General: Skin is warm and dry.      Findings: No erythema.   Neurological:      Mental Status: She is alert and oriented to person, place, and time.   Psychiatric:         Behavior: Behavior normal.         Ortho Exam        Procedures  No Procedures performed today        Note: Portions of this record may have been created with voice recognition software. Occasional wrong word or \"sound a like\" substitutions may have occurred due to the inherent limitations of " voice recognition software. Please read the chart carefully and recognize, using context, where substitutions have occurred.*

## 2025-03-06 NOTE — LETTER
March 6, 2025     Patient: Patrica Montelongo   YOB: 1965   Date of Visit: 3/6/2025       To Whom It May Concern:    It is my medical opinion that Patrica Montelongo may return to work on 3/10/25 .    If you have any questions or concerns, please don't hesitate to call.         Sincerely,        LULA BLAIR    CC: No Recipients

## 2025-07-08 ENCOUNTER — OFFICE VISIT (OUTPATIENT)
Age: 60
End: 2025-07-08
Payer: COMMERCIAL

## 2025-07-08 VITALS
WEIGHT: 138.2 LBS | TEMPERATURE: 97.9 F | HEART RATE: 92 BPM | DIASTOLIC BLOOD PRESSURE: 89 MMHG | RESPIRATION RATE: 18 BRPM | SYSTOLIC BLOOD PRESSURE: 148 MMHG | BODY MASS INDEX: 26.11 KG/M2 | OXYGEN SATURATION: 97 %

## 2025-07-08 DIAGNOSIS — J45.901 ASTHMA WITH ACUTE EXACERBATION, UNSPECIFIED ASTHMA SEVERITY, UNSPECIFIED WHETHER PERSISTENT: Primary | ICD-10-CM

## 2025-07-08 PROCEDURE — G0382 LEV 3 HOSP TYPE B ED VISIT: HCPCS | Performed by: NURSE PRACTITIONER

## 2025-07-08 RX ORDER — PREDNISONE 20 MG/1
20 TABLET ORAL 2 TIMES DAILY WITH MEALS
Qty: 10 TABLET | Refills: 0 | Status: SHIPPED | OUTPATIENT
Start: 2025-07-08 | End: 2025-07-13

## 2025-07-08 RX ORDER — BENZONATATE 200 MG/1
200 CAPSULE ORAL 3 TIMES DAILY PRN
Qty: 20 CAPSULE | Refills: 0 | Status: SHIPPED | OUTPATIENT
Start: 2025-07-08

## 2025-07-08 RX ORDER — ALBUTEROL SULFATE 90 UG/1
2 INHALANT RESPIRATORY (INHALATION) EVERY 6 HOURS PRN
COMMUNITY

## 2025-07-08 NOTE — ASSESSMENT & PLAN NOTE
Orders:    predniSONE 20 mg tablet; Take 1 tablet (20 mg total) by mouth 2 (two) times a day with meals for 5 days    benzonatate (TESSALON) 200 MG capsule; Take 1 capsule (200 mg total) by mouth 3 (three) times a day as needed for cough

## 2025-07-08 NOTE — PROGRESS NOTES
[facility]  Name: Patrica Montelongo      : 1965      MRN: [id number]  Encounter Provider: ANSELMO Camargo  Encounter Date: 2025   Encounter department: [facility] [place]  :  Assessment & Plan  Asthma with acute exacerbation, unspecified asthma severity, unspecified whether persistent    Orders:    predniSONE 20 mg tablet; Take 1 tablet (20 mg total) by mouth 2 (two) times a day with meals for 5 days    benzonatate (TESSALON) 200 MG capsule; Take 1 capsule (200 mg total) by mouth 3 (three) times a day as needed for cough        Patient Instructions  Likely viral bug, causing exacerbation of asthma  Mild diarrhea; maintain hydration   Follow up with PCP in 3-5 days.  Proceed to  ER if symptoms worsen.    If tests are performed, our office will contact you with results only if changes need to made to the care plan discussed with you at the visit. [facility]    Chief Complaint:   Chief Complaint   Patient presents with    Cough     Symptoms started 3 days ago. C/o chest congestion, headache and SOB.Neg home covid test.      History of Present Illness   HPI  Presents to the clinic with complain of cold symptoms that started 3 days ago. Includes chills, fatigue, headache, cough, congestion, and mild chest tightness/wheezing. Also diarrhea, twice yesterday and once today. Last episode was about 4 hrs ago. Soft, with no blood in stool.     Review of Systems   Constitutional:  Positive for chills and fatigue. Negative for appetite change and fever.   HENT:  Positive for congestion. Negative for ear pain, postnasal drip, rhinorrhea and sore throat.    Respiratory:  Positive for cough, chest tightness, shortness of breath and wheezing.    Cardiovascular:  Negative for chest pain.   Gastrointestinal:  Positive for abdominal pain and diarrhea (last episode 4 hrs ago, soft). Negative for blood in stool, nausea and vomiting.   Neurological:  Positive  "for headaches.     Past Medical History   Past Medical History[1]  Past Surgical History[2]  Family History[3]  she reports that she has never smoked. She has never used smokeless tobacco.  Current Outpatient Medications   Medication Instructions    acetaminophen (TYLENOL) 650 mg, Oral, Every 8 hours PRN    albuterol (PROVENTIL HFA,VENTOLIN HFA) 90 mcg/act inhaler 2 puffs, Every 6 hours PRN    budesonide-formoterol (SYMBICORT) 80-4.5 MCG/ACT inhaler 2 puffs, 2 times daily    Cholecalciferol 2,000 Units    Fesoterodine Fumarate ER 4 MG TB24 1 tablet, Daily    gabapentin (NEURONTIN) 300 mg, Daily at bedtime    ibuprofen (MOTRIN) 400 mg, Oral, Every 6 hours PRN    levothyroxine 137 mcg tablet 1 tablet, Daily    methocarbamol (ROBAXIN) 500 mg, Oral, 2 times daily PRN, No driving while on this medication.    methylPREDNISolone 4 MG tablet therapy pack Use as directed on package    Myrbetriq 50 MG TB24 1 tablet, Daily    omeprazole (PRILOSEC) 40 mg    ondansetron (ZOFRAN) 4 mg, Oral, Every 8 hours PRN    Wegovy 1.7 mg   Allergies[4]     Objective   /89   Pulse 92   Temp 97.9 °F (36.6 °C)   Resp 18   Wt 62.7 kg (138 lb 3.2 oz)   SpO2 97%   BMI 26.11 kg/m²      Physical Exam  Constitutional:       General: She is not in acute distress.     Appearance: She is not ill-appearing.   HENT:      Head:      Comments: NTTP of the sinuses     Right Ear: Tympanic membrane normal.      Left Ear: Tympanic membrane normal.      Nose: Rhinorrhea present.      Mouth/Throat:      Pharynx: No posterior oropharyngeal erythema.     Cardiovascular:      Rate and Rhythm: Regular rhythm.      Heart sounds: Normal heart sounds.   Pulmonary:      Effort: Pulmonary effort is normal.      Breath sounds: Normal breath sounds. No wheezing.   Lymphadenopathy:      Cervical: No cervical adenopathy.         Portions of the record may have been created with voice recognition software.  Occasional wrong word or \"sound a like\" substitutions may " "have occurred due to the inherent limitations of voice recognition software.  Read the chart carefully and recognize, using context, where substitutions have occurred.         [1] No past medical history on file.  [2] No past surgical history on file.  [3] No family history on file.  [4]   Allergies  Allergen Reactions    Gadobutrol Hives    Iopromide Hives     Developed hives after injection today 3/23/17. 0915 noted hives x 2 on pt's face. Denies other symptoms. Benadryl 25po given with 500 cc H20. Dr. Alcala in to see pt- pt released at 0930- states feeling \"better\".     "